# Patient Record
Sex: MALE | Race: WHITE | NOT HISPANIC OR LATINO | URBAN - METROPOLITAN AREA
[De-identification: names, ages, dates, MRNs, and addresses within clinical notes are randomized per-mention and may not be internally consistent; named-entity substitution may affect disease eponyms.]

---

## 2021-04-29 PROBLEM — Z00.00 ENCOUNTER FOR PREVENTIVE HEALTH EXAMINATION: Status: ACTIVE | Noted: 2021-04-29

## 2024-10-07 ENCOUNTER — INPATIENT (INPATIENT)
Facility: HOSPITAL | Age: 89
LOS: 2 days | Discharge: NURSING, REHAB OR LONG-TERM CARE | DRG: 379 | End: 2024-10-10
Attending: HOSPITALIST | Admitting: HOSPITALIST
Payer: MEDICARE

## 2024-10-07 VITALS
OXYGEN SATURATION: 98 % | DIASTOLIC BLOOD PRESSURE: 65 MMHG | TEMPERATURE: 97 F | HEART RATE: 82 BPM | RESPIRATION RATE: 19 BRPM | SYSTOLIC BLOOD PRESSURE: 106 MMHG

## 2024-10-07 DIAGNOSIS — K92.2 GASTROINTESTINAL HEMORRHAGE, UNSPECIFIED: ICD-10-CM

## 2024-10-07 PROCEDURE — 99222 1ST HOSP IP/OBS MODERATE 55: CPT | Mod: GC

## 2024-10-07 RX ORDER — PANTOPRAZOLE SODIUM 40 MG/1
80 TABLET, DELAYED RELEASE ORAL ONCE
Refills: 0 | Status: COMPLETED | OUTPATIENT
Start: 2024-10-07 | End: 2024-10-07

## 2024-10-07 RX ORDER — PANTOPRAZOLE SODIUM 40 MG/1
40 TABLET, DELAYED RELEASE ORAL EVERY 12 HOURS
Refills: 0 | Status: DISCONTINUED | OUTPATIENT
Start: 2024-10-08 | End: 2024-10-10

## 2024-10-07 RX ADMIN — PANTOPRAZOLE SODIUM 80 MILLIGRAM(S): 40 TABLET, DELAYED RELEASE ORAL at 23:52

## 2024-10-07 RX ADMIN — Medication 0.25 MILLIGRAM(S): at 23:45

## 2024-10-07 NOTE — H&P ADULT - NSHPPHYSICALEXAM_GEN_ALL_CORE
VITALS:   Vital Signs Last 24 Hrs  T(C): 36.3 (07 Oct 2024 22:38), Max: 36.3 (07 Oct 2024 22:38)  T(F): 97.3 (07 Oct 2024 22:38), Max: 97.3 (07 Oct 2024 22:38)  HR: 82 (07 Oct 2024 22:38) (82 - 82)  BP: 106/65 (07 Oct 2024 22:38) (106/65 - 106/65)  BP(mean): --  RR: 19 (07 Oct 2024 22:38) (19 - 19)  SpO2: 98% (07 Oct 2024 22:38) (98% - 98%)    Parameters below as of 07 Oct 2024 22:38  Patient On (Oxygen Delivery Method): room air      Pt refused examination  General examination: Pt NAD, lying in bed comfortably, Large ecchymosis of the Rt hand and forearm noted

## 2024-10-07 NOTE — H&P ADULT - PROBLEM SELECTOR PLAN 3
p/w Na 152  likely in the setting of dehydration and poor oral intake  currently NPO for tentative scope in AM  c/w D5LR  f/u serum and urine osm and urine Na

## 2024-10-07 NOTE — H&P ADULT - PROBLEM SELECTOR PLAN 2
hx of obstructive uropathy s/p suprapubic cath   p/w BUN and SCr of 44 and 1.37  FARHAN in the setting of poor oral intake and dehydration vs CKD   monitor BMP   c/w D5LR for hypernatremia and being NPO

## 2024-10-07 NOTE — H&P ADULT - ASSESSMENT
89 yrs old M, from home, wheelchair bound, pmhx of dementia, GERD, remote GIB, TBI, obstructive uropathy s/p suprapubic catheter, pshx benign thyroid mass resection, [facility] of upper GI bleed. Pt is admitted for GIB mnx requiring endoscopy.

## 2024-10-07 NOTE — H&P ADULT - PROBLEM SELECTOR PLAN 4
hx of dementia, AAOx1 at baseline, not on any medication at home  s/p Ativan 0.25 mg IV push x2 given agitation and combative

## 2024-10-07 NOTE — H&P ADULT - HISTORY OF PRESENT ILLNESS
89 yrs old M, from home, wheelchair bound, pmhx of dementia, GERD, remote GIB, TBI, obstructive uropathy s/p suprapubic catheter, pshx benign thyroid mass resection, transferred to [facility] for further management of upper GI bleed. Pt AAOx1 at baseline, [place]ese speaking, limited english speaking, refused to provide history and examination. Collateral information obtained from the patient's son, who stated that patient had a remote hx of DVT, concern for PE in the past however never on AC for it, reported episode of coffee ground emesis, melena for which he visited [facility], one unit pRBC was provided however extravasated and infiltrated the Rt arm, Hb on 10/6 was 8, INR WNL, BUN 86. SCr 1.5.   Of note, GOC discussed with the family, they would like to pursue CPR if and when required, refused intubation however would be accepting other forms  of non-invasive ventilation such as BIPAP should the need arise, no dialysis and no feeding tube.

## 2024-10-07 NOTE — H&P ADULT - PROBLEM SELECTOR PLAN 1
p/w coffee ground emesis, melena   Hb on admission 6.8, confirmation CBC showed Hb 7.2  Temp 97.3, HR 82, /65, RR 19 , Sat 98% on RA   no active bleeding since admission   will give 2U pRBC   s/p Protonix 80 mg IV  c/w Protonix 40 mg IV BID for now  NPO for tentative endoscopy in AM  GI consult in AM

## 2024-10-07 NOTE — H&P ADULT - ATTENDING COMMENTS
HPI  89 year old male Cape Verdean speaking patient with pmhx DVT many years ago and small PE many years ago (not on AC), Dementia (A&Ox1 at baseline), wheelchair-bound, remote history of Gi bleed, TBI, obstructive uropathy s/p suprapubic catheter, hx of benign thyroid mass resection who was transferred to [facility] for further management of coffee ground emesis and melena.  Patient is A&Ox1 and poor historian, mostly upset that he needs to get further blood work. Patient had coffee ground emesis and melena so he went to [facility] where he was found to have a Hgb in the 7's and transfused 2 units pRBCs with follow up CBC showing a Hgb of 7.3. BUN of 67 with an elevated BUN:Cr ratio of 48. Iron studies done showed iron of 43, TIBC 231, iron saturation 18.6%, Ferritin 52. Coags were WNL. GI evaluated patient but decided not to scope due to advanced age.  Called patient's son Silas (Director of Surgery/PA) (phone number [phone]). Patient has never had a colonoscopy or endoscopy. He takes no medications at home. No AC and no aspirin. He does not have history of CAD, COPD, HTN, HLD, or DM. Patient's son wants the patient to be CPR/DNI. Explained that in the hospital CPR is typically followed by intubation. Son understands but still wants his father to be CPR/DNI (BiPAP is okay)/No dialysis/No feeding tube and in case CPR happens, son wants patient to just have supportive care without intubation. Son gives verbal consent over the phone for pRBC transfusion as needed.     Physical Exam  General: Awake, Alert, Oriented x1, Upset due to getting blood draws  Cardiac: RRR  Pulmonary: CTA b/l  Abdominal: Soft, ND, NT  Extremities: No edema b/l, bruises on arms especially right arm present on arrival (due to blood draws and IV lines at prior hospital)    A/P  # Upper GI Bleed  > Hgb of 7.3 prior to transfer with BUN:Cr ratio of 48  > Iron 43, TIBC 231, Ferritin 52, Iron Sat 18.6%, Retic count 3.8 (Retic Index 0.98)  > s/p 2 units pRBC at [facility]  - Cardiac Telemetry Monitoring  - Check EKG  - Consult GI  - IV PPI BID  - SCDs  - NPO  - IV Fluid Hydration  - Serial CBC Q6H to monitor Hgb  - Transfuse for Hgb < 7    # Hypernatremia  > Serum sodium 152 on admission  - IV Fluid Hydration with D5-LR or D5-1/2 NS  - Monitor Serum Sodium  - Reduced serum sodium no more than 8-10 mEq/L/hr    # Hx of DVT/PE many years ago, Dementia (A&Ox1 at baseline), wheelchair-bound, GI Bleed, TBI, obstructive uropathy s/p suprapubic catheter, hx of benign thyroid mass resection  - Patient does not take any medications at home    # DVT PPx  - SCDs    # FEN  - NPO  - Monitor and replete electrolytes as needed  - IV Fluid Hydration  - Aspiration Precautions  - Fall Precautions    CPR/DNI (supportive care instead of intubation)    Patient case and management was discussed with ER Attending  I did examine all labs (including CBC, Reticulocyte Percentage, CMP, Mg, Phos, Troponin, pro-BNP), imaging, prior notes

## 2024-10-07 NOTE — H&P ADULT - CONVERSATION DETAILS
CHELITA discussed with pt's son. He expressed his wishes for CPR, refused intubation however would be accepting other forms  of non-invasive ventilation such as BIPAP should the need arise, no dialysis and no feeding tube.     Writer explained to the patient's son that per the protocol for CPR, intubation will be required and is part of the protocol. Further GOC to be done by Attending physician.

## 2024-10-08 ENCOUNTER — TRANSCRIPTION ENCOUNTER (OUTPATIENT)
Age: 89
End: 2024-10-08

## 2024-10-08 DIAGNOSIS — Z29.9 ENCOUNTER FOR PROPHYLACTIC MEASURES, UNSPECIFIED: ICD-10-CM

## 2024-10-08 DIAGNOSIS — N17.9 ACUTE KIDNEY FAILURE, UNSPECIFIED: ICD-10-CM

## 2024-10-08 DIAGNOSIS — K92.2 GASTROINTESTINAL HEMORRHAGE, UNSPECIFIED: ICD-10-CM

## 2024-10-08 DIAGNOSIS — E87.0 HYPEROSMOLALITY AND HYPERNATREMIA: ICD-10-CM

## 2024-10-08 LAB
ABO RH CONFIRMATION: SIGNIFICANT CHANGE UP
ALBUMIN SERPL ELPH-MCNC: 2.5 G/DL — LOW (ref 3.5–5)
ALP SERPL-CCNC: 55 U/L — SIGNIFICANT CHANGE UP (ref 40–120)
ALT FLD-CCNC: 11 U/L DA — SIGNIFICANT CHANGE UP (ref 10–60)
ANION GAP SERPL CALC-SCNC: 6 MMOL/L — SIGNIFICANT CHANGE UP (ref 5–17)
ANION GAP SERPL CALC-SCNC: 6 MMOL/L — SIGNIFICANT CHANGE UP (ref 5–17)
ANISOCYTOSIS BLD QL: SLIGHT — SIGNIFICANT CHANGE UP
APTT BLD: 31.4 SEC — SIGNIFICANT CHANGE UP (ref 24.5–35.6)
AST SERPL-CCNC: 11 U/L — SIGNIFICANT CHANGE UP (ref 10–40)
BASOPHILS # BLD AUTO: 0.07 K/UL — SIGNIFICANT CHANGE UP (ref 0–0.2)
BASOPHILS NFR BLD AUTO: 0.9 % — SIGNIFICANT CHANGE UP (ref 0–2)
BILIRUB SERPL-MCNC: 0.4 MG/DL — SIGNIFICANT CHANGE UP (ref 0.2–1.2)
BLD GP AB SCN SERPL QL: SIGNIFICANT CHANGE UP
BUN SERPL-MCNC: 39 MG/DL — HIGH (ref 7–18)
BUN SERPL-MCNC: 44 MG/DL — HIGH (ref 7–18)
CALCIUM SERPL-MCNC: 8.5 MG/DL — SIGNIFICANT CHANGE UP (ref 8.4–10.5)
CALCIUM SERPL-MCNC: 8.5 MG/DL — SIGNIFICANT CHANGE UP (ref 8.4–10.5)
CHLORIDE SERPL-SCNC: 120 MMOL/L — HIGH (ref 96–108)
CHLORIDE SERPL-SCNC: 121 MMOL/L — HIGH (ref 96–108)
CO2 SERPL-SCNC: 25 MMOL/L — SIGNIFICANT CHANGE UP (ref 22–31)
CO2 SERPL-SCNC: 25 MMOL/L — SIGNIFICANT CHANGE UP (ref 22–31)
CREAT SERPL-MCNC: 1.37 MG/DL — HIGH (ref 0.5–1.3)
CREAT SERPL-MCNC: 1.43 MG/DL — HIGH (ref 0.5–1.3)
EGFR: 46 ML/MIN/1.73M2 — LOW
EGFR: 49 ML/MIN/1.73M2 — LOW
EOSINOPHIL # BLD AUTO: 0.1 K/UL — SIGNIFICANT CHANGE UP (ref 0–0.5)
EOSINOPHIL NFR BLD AUTO: 1.3 % — SIGNIFICANT CHANGE UP (ref 0–6)
GLUCOSE BLDC GLUCOMTR-MCNC: 123 MG/DL — HIGH (ref 70–99)
GLUCOSE BLDC GLUCOMTR-MCNC: 131 MG/DL — HIGH (ref 70–99)
GLUCOSE BLDC GLUCOMTR-MCNC: 134 MG/DL — HIGH (ref 70–99)
GLUCOSE BLDC GLUCOMTR-MCNC: 181 MG/DL — HIGH (ref 70–99)
GLUCOSE SERPL-MCNC: 117 MG/DL — HIGH (ref 70–99)
GLUCOSE SERPL-MCNC: 123 MG/DL — HIGH (ref 70–99)
HCT VFR BLD CALC: 21.6 % — LOW (ref 39–50)
HCT VFR BLD CALC: 22.9 % — LOW (ref 39–50)
HCT VFR BLD CALC: 25.4 % — LOW (ref 39–50)
HGB BLD-MCNC: 6.8 G/DL — CRITICAL LOW (ref 13–17)
HGB BLD-MCNC: 7.2 G/DL — LOW (ref 13–17)
HGB BLD-MCNC: 8.2 G/DL — LOW (ref 13–17)
HYPOCHROMIA BLD QL: SIGNIFICANT CHANGE UP
IMM GRANULOCYTES NFR BLD AUTO: 0.5 % — SIGNIFICANT CHANGE UP (ref 0–0.9)
INR BLD: 0.88 RATIO — SIGNIFICANT CHANGE UP (ref 0.85–1.16)
LYMPHOCYTES # BLD AUTO: 1.87 K/UL — SIGNIFICANT CHANGE UP (ref 1–3.3)
LYMPHOCYTES # BLD AUTO: 24.3 % — SIGNIFICANT CHANGE UP (ref 13–44)
MAGNESIUM SERPL-MCNC: 2 MG/DL — SIGNIFICANT CHANGE UP (ref 1.6–2.6)
MANUAL SMEAR VERIFICATION: SIGNIFICANT CHANGE UP
MCHC RBC-ENTMCNC: 28.6 PG — SIGNIFICANT CHANGE UP (ref 27–34)
MCHC RBC-ENTMCNC: 29.3 PG — SIGNIFICANT CHANGE UP (ref 27–34)
MCHC RBC-ENTMCNC: 29.9 PG — SIGNIFICANT CHANGE UP (ref 27–34)
MCHC RBC-ENTMCNC: 31.4 GM/DL — LOW (ref 32–36)
MCHC RBC-ENTMCNC: 31.5 GM/DL — LOW (ref 32–36)
MCHC RBC-ENTMCNC: 32.3 GM/DL — SIGNIFICANT CHANGE UP (ref 32–36)
MCV RBC AUTO: 90.8 FL — SIGNIFICANT CHANGE UP (ref 80–100)
MCV RBC AUTO: 92.7 FL — SIGNIFICANT CHANGE UP (ref 80–100)
MCV RBC AUTO: 93.1 FL — SIGNIFICANT CHANGE UP (ref 80–100)
MICROCYTES BLD QL: SLIGHT — SIGNIFICANT CHANGE UP
MONOCYTES # BLD AUTO: 0.79 K/UL — SIGNIFICANT CHANGE UP (ref 0–0.9)
MONOCYTES NFR BLD AUTO: 10.3 % — SIGNIFICANT CHANGE UP (ref 2–14)
NEUTROPHILS # BLD AUTO: 4.83 K/UL — SIGNIFICANT CHANGE UP (ref 1.8–7.4)
NEUTROPHILS NFR BLD AUTO: 62.7 % — SIGNIFICANT CHANGE UP (ref 43–77)
NRBC # BLD: 0 /100 WBCS — SIGNIFICANT CHANGE UP (ref 0–0)
NT-PROBNP SERPL-SCNC: 145 PG/ML — SIGNIFICANT CHANGE UP (ref 0–450)
OVALOCYTES BLD QL SMEAR: SLIGHT — SIGNIFICANT CHANGE UP
PHOSPHATE SERPL-MCNC: 2.5 MG/DL — SIGNIFICANT CHANGE UP (ref 2.5–4.5)
PLAT MORPH BLD: NORMAL — SIGNIFICANT CHANGE UP
PLATELET # BLD AUTO: 204 K/UL — SIGNIFICANT CHANGE UP (ref 150–400)
PLATELET # BLD AUTO: 205 K/UL — SIGNIFICANT CHANGE UP (ref 150–400)
PLATELET # BLD AUTO: 208 K/UL — SIGNIFICANT CHANGE UP (ref 150–400)
PLATELET COUNT - ESTIMATE: NORMAL — SIGNIFICANT CHANGE UP
POIKILOCYTOSIS BLD QL AUTO: SLIGHT — SIGNIFICANT CHANGE UP
POTASSIUM SERPL-MCNC: 3.9 MMOL/L — SIGNIFICANT CHANGE UP (ref 3.5–5.3)
POTASSIUM SERPL-MCNC: 4 MMOL/L — SIGNIFICANT CHANGE UP (ref 3.5–5.3)
POTASSIUM SERPL-SCNC: 3.9 MMOL/L — SIGNIFICANT CHANGE UP (ref 3.5–5.3)
POTASSIUM SERPL-SCNC: 4 MMOL/L — SIGNIFICANT CHANGE UP (ref 3.5–5.3)
PROT SERPL-MCNC: 5.8 G/DL — LOW (ref 6–8.3)
PROTHROM AB SERPL-ACNC: 10.2 SEC — SIGNIFICANT CHANGE UP (ref 9.9–13.4)
RBC # BLD: 2.38 M/UL — LOW (ref 4.2–5.8)
RBC # BLD: 2.38 M/UL — LOW (ref 4.2–5.8)
RBC # BLD: 2.46 M/UL — LOW (ref 4.2–5.8)
RBC # BLD: 2.74 M/UL — LOW (ref 4.2–5.8)
RBC # FLD: 15 % — HIGH (ref 10.3–14.5)
RBC # FLD: 15.4 % — HIGH (ref 10.3–14.5)
RBC # FLD: 15.6 % — HIGH (ref 10.3–14.5)
RBC BLD AUTO: ABNORMAL
RETICS #: 87 K/UL — SIGNIFICANT CHANGE UP (ref 25–125)
RETICS/RBC NFR: 3.8 % — HIGH (ref 0.5–2.5)
SCHISTOCYTES BLD QL AUTO: SLIGHT — SIGNIFICANT CHANGE UP
SODIUM SERPL-SCNC: 151 MMOL/L — HIGH (ref 135–145)
SODIUM SERPL-SCNC: 152 MMOL/L — HIGH (ref 135–145)
SODIUM UR-SCNC: 114 MMOL/L — SIGNIFICANT CHANGE UP
STOMATOCYTES BLD QL SMEAR: SLIGHT — SIGNIFICANT CHANGE UP
TROPONIN I, HIGH SENSITIVITY RESULT: 21.4 NG/L — SIGNIFICANT CHANGE UP
WBC # BLD: 7.43 K/UL — SIGNIFICANT CHANGE UP (ref 3.8–10.5)
WBC # BLD: 7.7 K/UL — SIGNIFICANT CHANGE UP (ref 3.8–10.5)
WBC # BLD: 8.08 K/UL — SIGNIFICANT CHANGE UP (ref 3.8–10.5)
WBC # FLD AUTO: 7.43 K/UL — SIGNIFICANT CHANGE UP (ref 3.8–10.5)
WBC # FLD AUTO: 7.7 K/UL — SIGNIFICANT CHANGE UP (ref 3.8–10.5)
WBC # FLD AUTO: 8.08 K/UL — SIGNIFICANT CHANGE UP (ref 3.8–10.5)

## 2024-10-08 PROCEDURE — 99233 SBSQ HOSP IP/OBS HIGH 50: CPT

## 2024-10-08 PROCEDURE — 43255 EGD CONTROL BLEEDING ANY: CPT

## 2024-10-08 DEVICE — SET OTSC SYSTEM 11/6A: Type: IMPLANTABLE DEVICE | Status: FUNCTIONAL

## 2024-10-08 RX ORDER — SODIUM CHLORIDE IRRIG SOLUTION 0.9 %
1000 SOLUTION, IRRIGATION IRRIGATION
Refills: 0 | Status: DISCONTINUED | OUTPATIENT
Start: 2024-10-08 | End: 2024-10-09

## 2024-10-08 RX ORDER — SODIUM CHLORIDE IRRIG SOLUTION 0.9 %
1000 SOLUTION, IRRIGATION IRRIGATION
Refills: 0 | Status: DISCONTINUED | OUTPATIENT
Start: 2024-10-09 | End: 2024-10-09

## 2024-10-08 RX ORDER — SODIUM CHLORIDE IRRIG SOLUTION 0.9 %
1000 SOLUTION, IRRIGATION IRRIGATION
Refills: 0 | Status: DISCONTINUED | OUTPATIENT
Start: 2024-10-08 | End: 2024-10-08

## 2024-10-08 RX ADMIN — Medication 75 MILLILITER(S): at 12:35

## 2024-10-08 RX ADMIN — Medication 100 MILLILITER(S): at 17:31

## 2024-10-08 RX ADMIN — PANTOPRAZOLE SODIUM 40 MILLIGRAM(S): 40 TABLET, DELAYED RELEASE ORAL at 17:34

## 2024-10-08 RX ADMIN — Medication 1 GRAM(S): at 17:40

## 2024-10-08 RX ADMIN — PANTOPRAZOLE SODIUM 40 MILLIGRAM(S): 40 TABLET, DELAYED RELEASE ORAL at 05:41

## 2024-10-08 RX ADMIN — Medication 0.25 MILLIGRAM(S): at 02:09

## 2024-10-08 RX ADMIN — Medication 70 MILLILITER(S): at 02:09

## 2024-10-08 NOTE — PROGRESS NOTE ADULT - PROBLEM SELECTOR PLAN 1
p/w coffee ground emesis, melena   Hb on admission 6.8, confirmation CBC showed Hb 7.2  Temp 97.3, HR 82, /65, RR 19 , Sat 98% on RA   no active bleeding since admission   s/p 2 units PRBC at [facility]  hgb now 6.8-7.2  s/p 1 unit PRBC on 10/5   f/u repeat Hgb  c/w Protonix 40 mg IV BID for now  NPO for tentative endoscopy   GI consulted

## 2024-10-08 NOTE — CONSULT NOTE ADULT - ASSESSMENT
1. Coffee ground emesis in this patient is most likely due to:     - Erosive esophagitis     - Esophageal ulcer     - Peptic ulcer disease  2. Sever anemia (etiology multifactorial)  3. No evidence of acute GI bleeding at present time    Suggestions:    1. Monitor H/H  2. Transfuse PRBC as needed  3. Protonix 40mg BID  4. Clear liquid diet  5. Avoid NSAID  6. High risk for endoscopy  7. DVT prophylaxis

## 2024-10-08 NOTE — CONSULT NOTE ADULT - SUBJECTIVE AND OBJECTIVE BOX
[  ] STAT REQUEST              [ X ] ROUTINE REQUEST    Patient is a 89 year old male with GI bleeding. GI consulted to evaluate.        HPI:  89 year old male from home, wheelchair bound, with past medical history significant for Dementia, GERD, DVT, remote GIB, TBI, obstructive uropathy s/p suprapubic catheter, pshx benign thyroid mass resection, presented to the [facility] with coffee ground emesis. Patient now transferred to [facility] for further care. Patient AAOx1 at baseline, Maldivian speaking and can not provide history. Information obtained from the patient's son, who stated that patient had a remote hx of DVT, concern for PE in the past however never on AC for it, reported episode of coffee ground emesis, melena for which he visited [facility], one unit pRBC was provided however extravasated and patient's Hgb on 10/6 was 8, INR WNL, BUN 86. SCr 1.5. No abdominal pain, nausea, vomiting, hematochezia, melena, fever, chills, chest pain, SOB, cough, hemoptysis. epistaxis, hematuria or diarrhea reported.         PAIN MANAGEMENT:  Pain Scale:                0 /10  Pain Location:           PAST MEDICAL HISTORY    Dementia    GERD    GI bleeding    DVT    Obstructive uropathy        PAST SURGICAL HISTORY    Suprapubic catheter placement    Thyroid mass resection         Allergies    No Known Allergies    Intolerances  None         MEDICATIONS  (STANDING):  dextrose 5% + lactated ringers. 1000 milliLiter(s) (70 mL/Hr) IV Continuous <Continuous>  pantoprazole  Injectable 40 milliGRAM(s) IV Push every 12 hours         SOCIAL HISTORY  Advanced Directives:       [ X ] Full Code       [  ] DNR  Marital Status:         [  ] M      [ X ] S      [  ] D       [  ] W  Children:       [ X ] Yes      [  ] No  Occupation:        [  ] Employed       [X  ] Unemployed       [  ] Retired  Diet:       [ X ] Regular       [  ] PEG feeding          [  ] NG tube feeding  Drug Use:           [ X ] No               [  ] Yes  Alcohol:           [ X ] No             [  ] Yes (socially)         [  ] Yes (chronic)  Tobacco:           [  ] Yes           [ X ] No      FAMILY HISTORY  [ X ] Heart Disease            [ X ] Diabetes             [ X ] HTN             [  ] Colon Cancer             [  ] Stomach Cancer              [  ] Pancreatic Cancer      VITAL SIGNS   Vital Signs Last 24 Hrs  T(C): 36.3 (10-08-24 @ 10:36), Max: 36.4 (10-08-24 @ 00:21)  T(F): 97.3 (10-08-24 @ 10:36), Max: 97.5 (10-08-24 @ 00:21)  HR: 85 (10-08-24 @ 10:36) (76 - 98)  BP: 113/71 (10-08-24 @ 10:36) (100/60 - 121/102)   RR: 18 (10-08-24 @ 10:36) (18 - 20)  SpO2: 100% (10-08-24 @ 10:36) (97% - 100%)  Daily Weight in k.7 (08 Oct 2024 04:28)           CBC Full  -  ( 08 Oct 2024 01:35 )  WBC Count : 7.43 K/uL  RBC Count : 2.46 M/uL  Hemoglobin : 7.2 g/dL  Hematocrit : 22.9 %  Platelet Count - Automated : 205 K/uL  Mean Cell Volume : 93.1 fl  Mean Cell Hemoglobin : 29.3 pg  Mean Cell Hemoglobin Concentration : 31.4 gm/dL  Auto Neutrophil # : x  Auto Lymphocyte # : x  Auto Monocyte # : x  Auto Eosinophil # : x  Auto Basophil # : x  Auto Neutrophil % : x  Auto Lymphocyte % : x  Auto Monocyte % : x  Auto Eosinophil % : x  Auto Basophil % : x      10-08    152[H]  |  121[H]  |  44[H]  ----------------------------<  123[H]  4.0   |  25  |  1.37[H]    Ca    8.5      08 Oct 2024 00:33  Phos  2.5     10-08  Mg     2.0     10-08    TPro  5.8[L]  /  Alb  2.5[L]  /  TBili  0.4  /  DBili  x   /  AST  11  /  ALT  11  /  AlkPhos  55  10-     PT/INR - ( 08 Oct 2024 01:35 )   PT: 10.2 sec;   INR: 0.88 ratio       PTT - ( 08 Oct 2024 01:35 )  PTT:31.4 sec                                 
PATIENT SEEN AND EXAMINED BY BRENDEN CORTEZ M.D. ON :- 10/8/24  DATE OF SERVICE:   10/8/24          Interim events noted,Labs ,Radiological studies and Cardiology tests reviewed .    Patient is a 89y old  Male who presents with a chief complaint of acute anemia 2/2 possible Upper GI bleed (08 Oct 2024 12:38)      HPI:  89 yrs old M, from home, wheelchair bound, pmhx of dementia, GERD, remote GIB, TBI, obstructive uropathy s/p suprapubic catheter, pshx benign thyroid mass resection, transferred to [facility] for further management of upper GI bleed. Pt AAOx1 at baseline, Kazakh speaking, limited english speaking, refused to provide history and examination. Collateral information obtained from the patient's son, who stated that patient had a remote hx of DVT, concern for PE in the past however never on AC for it, reported episode of coffee ground emesis, melena for which he visited [facility], one unit pRBC was provided however extravasated and infiltrated the Rt arm, Hb on 10/6 was 8, INR WNL, BUN 86. SCr 1.5.   Of note, GOC discussed with the family, they would like to pursue CPR if and when required, refused intubation however would be accepting other forms  of non-invasive ventilation such as BIPAP should the need arise, no dialysis and no feeding tube.  (07 Oct 2024 22:50)      PAST MEDICAL & SURGICAL HISTORY:      PREVIOUS DIAGNOSTIC TESTING:      ECHO  FINDINGS:    STRESS  FINDINGS:    CATHETERIZATION  FINDINGS:    MEDICATIONS  (STANDING):  dextrose 5% + lactated ringers. 1000 milliLiter(s) (100 mL/Hr) IV Continuous <Continuous>  pantoprazole  Injectable 40 milliGRAM(s) IV Push every 12 hours  sucralfate 1 Gram(s) Oral every 6 hours    MEDICATIONS  (PRN):      FAMILY HISTORY:      SOCIAL HISTORY:    CIGARETTES:    ALCOHOL:    REVIEW OF SYSTEMS:  CONSTITUTIONAL: No fever, weight loss, or fatigue  EYES: No eye pain, visual disturbances, or discharge  ENMT:  No difficulty hearing, tinnitus, vertigo; No sinus or throat pain  NECK: No pain or stiffness  RESPIRATORY: No cough, wheezing, chills or hemoptysis; No shortness of breath  CARDIOVASCULAR: No chest pain, palpitations, dizziness, or leg swelling  GASTROINTESTINAL: No abdominal or epigastric pain. No nausea, vomiting, or hematemesis; No diarrhea or constipation. No melena or hematochezia.  GENITOURINARY: No dysuria, frequency, hematuria, or incontinence  NEUROLOGICAL: No headaches, memory loss, loss of strength, numbness, or tremors  SKIN: No itching, burning, rashes, or lesions   LYMPH NODES: No enlarged glands  ENDOCRINE: No heat or cold intolerance; No hair loss  MUSCULOSKELETAL: No joint pain or swelling; No muscle, back, or extremity pain  PSYCHIATRIC: No depression, anxiety, mood swings, or difficulty sleeping  HEME/LYMPH: No easy bruising, or bleeding gums  ALLERY AND IMMUNOLOGIC: No hives or eczema    Vital Signs Last 24 Hrs  T(C): 36.4 (08 Oct 2024 19:35), Max: 36.5 (08 Oct 2024 15:28)  T(F): 97.5 (08 Oct 2024 19:35), Max: 97.7 (08 Oct 2024 15:28)  HR: 75 (08 Oct 2024 19:35) (75 - 98)  BP: 90/54 (08 Oct 2024 19:35) (89/51 - 121/102)  BP(mean): --  RR: 18 (08 Oct 2024 19:35) (16 - 20)  SpO2: 95% (08 Oct 2024 19:35) (94% - 100%)    Parameters below as of 08 Oct 2024 19:35  Patient On (Oxygen Delivery Method): room air          PHYSICAL EXAM:  GENERAL: NAD, well-groomed, well-developed  HEAD:  Atraumatic, Normocephalic  EYES: EOMI, PERRLA, conjunctiva and sclera clear  ENMT: No tonsillar erythema, exudates, or enlargement; Moist mucous membranes, Good dentition, No lesions  NECK: Supple, No JVD, Normal thyroid  NERVOUS SYSTEM:  Alert & Oriented X3, Good concentration; Motor Strength 5/5 B/L upper and lower extremities; DTRs 2+ intact and symmetric  CHEST/LUNG: Clear to percussion bilaterally; No rales, rhonchi, wheezing, or rubs  HEART: Regular rate and rhythm; No murmurs, rubs, or gallops  ABDOMEN: Soft, Nontender, Nondistended; Bowel sounds present  EXTREMITIES:  2+ Peripheral Pulses, No clubbing, cyanosis, or edema  LYMPH: No lymphadenopathy noted  SKIN: No rashes or lesions      INTERPRETATION OF TELEMETRY:    ECG:    CHADSVASC:     LABS:                        8.2    8.08  )-----------( 204      ( 08 Oct 2024 12:31 )             25.4     10-08    151[H]  |  120[H]  |  39[H]  ----------------------------<  117[H]  3.9   |  25  |  1.43[H]    Ca    8.5      08 Oct 2024 12:31  Phos  2.5     10-08  Mg     2.0     10-08    TPro  5.8[L]  /  Alb  2.5[L]  /  TBili  0.4  /  DBili  x   /  AST  11  /  ALT  11  /  AlkPhos  55  10-08        PT/INR - ( 08 Oct 2024 01:35 )   PT: 10.2 sec;   INR: 0.88 ratio         PTT - ( 08 Oct 2024 01:35 )  PTT:31.4 sec  Urinalysis Basic - ( 08 Oct 2024 12:31 )    Color: x / Appearance: x / SG: x / pH: x  Gluc: 117 mg/dL / Ketone: x  / Bili: x / Urobili: x   Blood: x / Protein: x / Nitrite: x   Leuk Esterase: x / RBC: x / WBC x   Sq Epi: x / Non Sq Epi: x / Bacteria: x      Lipid Panel:   I&O's Summary    07 Oct 2024 07:01  -  08 Oct 2024 07:00  --------------------------------------------------------  IN: 420 mL / OUT: 550 mL / NET: -130 mL    08 Oct 2024 07:01  -  08 Oct 2024 22:03  --------------------------------------------------------  IN: 0 mL / OUT: 800 mL / NET: -800 mL        RADIOLOGY & ADDITIONAL STUDIES:

## 2024-10-08 NOTE — PATIENT PROFILE ADULT - NSPROGENPREVTRANSF_GEN_A_NUR
The patient has been re-examined and I agree with the above assessment or I updated with my findings. cognitively impaired unable to assess

## 2024-10-08 NOTE — PATIENT PROFILE ADULT - FALL HARM RISK - HARM RISK INTERVENTIONS
Assistance with ambulation/Assistance OOB with selected safe patient handling equipment/Communicate Risk of Fall with Harm to all staff/Monitor for mental status changes/Reinforce activity limits and safety measures with patient and family/Reorient to person, place and time as needed/Review medications for side effects contributing to fall risk/Tailored Fall Risk Interventions/Use of alarms - bed, chair and/or voice tab/Visual Cue: Yellow wristband and red socks/Bed in lowest position, wheels locked, appropriate side rails in place/Call bell, personal items and telephone in reach/Instruct patient to call for assistance before getting out of bed or chair/Non-slip footwear when patient is out of bed/[place] to call system/Physically safe environment - no spills, clutter or unnecessary equipment/Purposeful Proactive Rounding/Room/bathroom lighting operational, light cord in reach

## 2024-10-09 ENCOUNTER — TRANSCRIPTION ENCOUNTER (OUTPATIENT)
Age: 89
End: 2024-10-09

## 2024-10-09 DIAGNOSIS — D64.9 ANEMIA, UNSPECIFIED: ICD-10-CM

## 2024-10-09 DIAGNOSIS — Z98.890 OTHER SPECIFIED POSTPROCEDURAL STATES: ICD-10-CM

## 2024-10-09 DIAGNOSIS — K26.4 CHRONIC OR UNSPECIFIED DUODENAL ULCER WITH HEMORRHAGE: ICD-10-CM

## 2024-10-09 DIAGNOSIS — E44.0 MODERATE PROTEIN-CALORIE MALNUTRITION: ICD-10-CM

## 2024-10-09 DIAGNOSIS — D62 ACUTE POSTHEMORRHAGIC ANEMIA: ICD-10-CM

## 2024-10-09 LAB
ANION GAP SERPL CALC-SCNC: 5 MMOL/L — SIGNIFICANT CHANGE UP (ref 5–17)
BUN SERPL-MCNC: 27 MG/DL — HIGH (ref 7–18)
CALCIUM SERPL-MCNC: 8.5 MG/DL — SIGNIFICANT CHANGE UP (ref 8.4–10.5)
CHLORIDE SERPL-SCNC: 117 MMOL/L — HIGH (ref 96–108)
CO2 SERPL-SCNC: 26 MMOL/L — SIGNIFICANT CHANGE UP (ref 22–31)
CREAT SERPL-MCNC: 1.22 MG/DL — SIGNIFICANT CHANGE UP (ref 0.5–1.3)
EGFR: 56 ML/MIN/1.73M2 — LOW
GLUCOSE SERPL-MCNC: 155 MG/DL — HIGH (ref 70–99)
HCT VFR BLD CALC: 22.7 % — LOW (ref 39–50)
HGB BLD-MCNC: 7.1 G/DL — LOW (ref 13–17)
MAGNESIUM SERPL-MCNC: 1.8 MG/DL — SIGNIFICANT CHANGE UP (ref 1.6–2.6)
MCHC RBC-ENTMCNC: 29.3 PG — SIGNIFICANT CHANGE UP (ref 27–34)
MCHC RBC-ENTMCNC: 31.3 GM/DL — LOW (ref 32–36)
MCV RBC AUTO: 93.8 FL — SIGNIFICANT CHANGE UP (ref 80–100)
NRBC # BLD: 0 /100 WBCS — SIGNIFICANT CHANGE UP (ref 0–0)
OSMOLALITY UR: 564 MOSM/KG — SIGNIFICANT CHANGE UP (ref 50–1200)
PHOSPHATE SERPL-MCNC: 2.2 MG/DL — LOW (ref 2.5–4.5)
PLATELET # BLD AUTO: 201 K/UL — SIGNIFICANT CHANGE UP (ref 150–400)
POTASSIUM SERPL-MCNC: 3.6 MMOL/L — SIGNIFICANT CHANGE UP (ref 3.5–5.3)
POTASSIUM SERPL-SCNC: 3.6 MMOL/L — SIGNIFICANT CHANGE UP (ref 3.5–5.3)
RBC # BLD: 2.42 M/UL — LOW (ref 4.2–5.8)
RBC # FLD: 15.6 % — HIGH (ref 10.3–14.5)
SODIUM SERPL-SCNC: 148 MMOL/L — HIGH (ref 135–145)
WBC # BLD: 8.39 K/UL — SIGNIFICANT CHANGE UP (ref 3.8–10.5)
WBC # FLD AUTO: 8.39 K/UL — SIGNIFICANT CHANGE UP (ref 3.8–10.5)

## 2024-10-09 PROCEDURE — 99233 SBSQ HOSP IP/OBS HIGH 50: CPT

## 2024-10-09 RX ORDER — SODIUM CHLORIDE IRRIG SOLUTION 0.9 %
1000 SOLUTION, IRRIGATION IRRIGATION
Refills: 0 | Status: DISCONTINUED | OUTPATIENT
Start: 2024-10-09 | End: 2024-10-10

## 2024-10-09 RX ORDER — SODIUM CHLORIDE IRRIG SOLUTION 0.9 %
500 SOLUTION, IRRIGATION IRRIGATION ONCE
Refills: 0 | Status: COMPLETED | OUTPATIENT
Start: 2024-10-09 | End: 2024-10-09

## 2024-10-09 RX ORDER — SODIUM CHLORIDE IRRIG SOLUTION 0.9 %
1000 SOLUTION, IRRIGATION IRRIGATION
Refills: 0 | Status: DISCONTINUED | OUTPATIENT
Start: 2024-10-09 | End: 2024-10-09

## 2024-10-09 RX ADMIN — Medication 100 MILLILITER(S): at 04:44

## 2024-10-09 RX ADMIN — PANTOPRAZOLE SODIUM 40 MILLIGRAM(S): 40 TABLET, DELAYED RELEASE ORAL at 05:04

## 2024-10-09 RX ADMIN — Medication 1 GRAM(S): at 05:04

## 2024-10-09 RX ADMIN — Medication 100 MILLILITER(S): at 00:52

## 2024-10-09 RX ADMIN — Medication 1 GRAM(S): at 00:54

## 2024-10-09 RX ADMIN — Medication 1000 MILLILITER(S): at 18:48

## 2024-10-09 RX ADMIN — PANTOPRAZOLE SODIUM 40 MILLIGRAM(S): 40 TABLET, DELAYED RELEASE ORAL at 17:58

## 2024-10-09 RX ADMIN — Medication 1 GRAM(S): at 17:58

## 2024-10-09 RX ADMIN — Medication 100 MILLILITER(S): at 18:24

## 2024-10-09 NOTE — PROGRESS NOTE ADULT - PROBLEM SELECTOR PLAN 1
p/w coffee ground emesis, melena   Hb on admission 6.8, confirmation CBC showed Hb 7.2  Temp 97.3, HR 82, /65, RR 19 , Sat 98% on RA   no active bleeding since admission   s/p 2 units PRBC at [facility]  s/p 1 unit PRBC on 10/5 > hgb 8.2  s/p endoscopy on 10/9 with clipping of duodenal ulcer   hgb today 7.1, transfuse 1 more unit prbc   c/w Protonix 40 mg IV BID for now  c/w sucralfate 1g qid x2 weeks   f/u h. pylori stool   GI following

## 2024-10-09 NOTE — PROGRESS NOTE ADULT - PROBLEM SELECTOR PLAN 6
hx of dementia, AAOx1 at baseline, not on any medication at home  s/p Ativan 0.25 mg IV push x2 given agitation and combative  hold ativan for now  c/w enhanced observation  fall risk precautions

## 2024-10-09 NOTE — PROGRESS NOTE ADULT - PROBLEM SELECTOR PLAN 5
DVT- SCD   GI - PPI IV
pt p/w suprapubic catheter prior to arrival from [facility]  catheter appears to be very old with blue/green colored biofilm inside catheter  consider urology eval for exchange

## 2024-10-09 NOTE — PROGRESS NOTE ADULT - PROBLEM SELECTOR PLAN 1
-Monitor H&H  -Transfuse as needed  -s/p EGD as noted above, Duodenal bulb ulcer with visible vessel. Hemostasis with over-the-scope clip.  -Clear liquid diet. Advance as tolerated  -Pantoprazole 40 mg IV q12h  -Use Carafate 1g orally four times daily for 2 weeks  -Check stool H. pylori Ag, treat if positive  -GI will continue to follow    This note and its recommendations herein are preliminary until such time as cosigned by an attending. -Monitor H&H  -Transfuse as needed  -s/p EGD as noted above, Duodenal bulb ulcer with visible vessel. Hemostasis with over-the-scope clip.  -Clear liquid diet.   -Pantoprazole 40 mg IV q12h  -Use Carafate 1g orally four times daily for 2 weeks  -Check stool H. pylori Ag, treat if positive  -GI will continue to follow    This note and its recommendations herein are preliminary until such time as cosigned by an attending.

## 2024-10-09 NOTE — DISCHARGE NOTE PROVIDER - NSDCMRMEDTOKEN_GEN_ALL_CORE_FT
Protonix 40 mg oral delayed release tablet: 1 tab(s) orally every 12 hours  sucralfate 1 g oral tablet: 1 tab(s) orally every 6 hours

## 2024-10-09 NOTE — DISCHARGE NOTE PROVIDER - CARE PROVIDER_API CALL
Inés Wilhelm  Gastroenterology  [address]  [place] [address]  Phone: [phone]  Fax: [phone]  Follow Up Time: 1 week

## 2024-10-09 NOTE — DISCHARGE NOTE PROVIDER - NSDCCPCAREPLAN_GEN_ALL_CORE_FT
PRINCIPAL DISCHARGE DIAGNOSIS  Diagnosis: Bleeding duodenal ulcer  Assessment and Plan of Treatment: you initially transferred from [facility]   you had a total of 4 units of blood so far  you had a endoscopy on 10/8 that showed duodenal ulcer and you had it clipped  you must continue taking protonix 40 mg twice a day  and continue sucralfate 1g four times a day for the next 2 weeks 10/8-10/20.   you should also follow up with your Primary Care Doctor  follow up with Gastroenterologist Dr. Wilhelm and make an appointment      SECONDARY DISCHARGE DIAGNOSES  Diagnosis: Anemia due to acute blood loss  Assessment and Plan of Treatment: you were recently found to have anemia due to bleeding from ulcer  you were given multiple units of blood  Eat healthy foods rich in iron and vitamin C. Nuts, meat, dark leafy green vegetables, and beans are high in iron and protein. Vitamin C helps your body absorb iron. Foods rich in vitamin C include oranges and other citrus fruits.   follow up with your Primary Care doctor to monitor your hemoglobin.    Diagnosis: Hypernatremia  Assessment and Plan of Treatment: you were found to have high sodium levels due to poor oral intake  you were given IV fluids and your sodium levels improved   try to drink 6-8 glasses of water a day if possible  stay hydrated    Diagnosis: FARHAN (acute kidney injury)  Assessment and Plan of Treatment: you were found to have mildly elevated creatinine due to recent blood loss and poor oral intake. this is now improved by discharge.    Diagnosis: History of suprapubic catheter  Assessment and Plan of Treatment: you have a history of suprapubic catheter,  it is functioning properly draining clear yellow urine  follow up with your outpatient urologist.   monitor for any abdominal pain, fever, or poor output from [place]    Diagnosis: Dementia  Assessment and Plan of Treatment: you have a history of dementia and traumatic brain injury  you are not on any medications  you are at risk of falls  sit up when your eat to preven aspiration    Diagnosis: Moderate protein-calorie malnutrition  Assessment and Plan of Treatment: your albumin level was 2.5   continue to eat as tolerated and use ensure shakes/pudding three times a day     PRINCIPAL DISCHARGE DIAGNOSIS  Diagnosis: Bleeding duodenal ulcer  Assessment and Plan of Treatment: you were admitted for gastrointestinal bleeding  you had a total of 2 units of blood so far  you had a endoscopy on 10/8 that showed duodenal ulcer and you had it clipped  you must continue taking protonix 40 mg twice a day  and continue sucralfate 1g four times a day for the next 2 weeks 10/8-10/20.   you should also follow up with your Primary Care Doctor  follow up with Gastroenterologist Dr. Wilhelm and make an appointment      SECONDARY DISCHARGE DIAGNOSES  Diagnosis: Anemia due to acute blood loss  Assessment and Plan of Treatment: you were recently found to have anemia due to bleeding from ulcer  you were given multiple units of blood  Eat healthy foods rich in iron and vitamin C. Nuts, meat, dark leafy green vegetables, and beans are high in iron and protein. Vitamin C helps your body absorb iron. Foods rich in vitamin C include oranges and other citrus fruits.   follow up with your Primary Care doctor to monitor your hemoglobin.    Diagnosis: FARHAN (acute kidney injury)  Assessment and Plan of Treatment: you were found to have mildly elevated creatinine due to recent blood loss and poor oral intake. follow up with primary care provider for surveillance  Manage FARHAN  Manage other health conditions such as diabetes, high blood pressure, or heart disease. These conditions increase your risk for acute kidney injury. Take your medicines for these conditions as directed. Also, monitor your blood sugar and blood pressure levels as directed.  NSAIDs, stomach medicine, or laxatives may harm your kidneys and increase your risk for acute kidney injury  Drink liquids to help your kidneys work better and decrease your risk for dehydration.      Diagnosis: Hypernatremia  Assessment and Plan of Treatment: you were found to have high sodium levels due to poor oral intake  you were given IV fluids and your sodium levels improved   try to drink 6-8 glasses of water a day if possible  stay hydrated    Diagnosis: History of suprapubic catheter  Assessment and Plan of Treatment: you have a history of suprapubic catheter. The catheter was changed today 10/10/2024  it is functioning properly draining clear yellow urine  follow up with your outpatient urologist.   monitor for any abdominal pain, fever, or poor output from [place]    Diagnosis: Dementia  Assessment and Plan of Treatment: you have a history of dementia and traumatic brain injury  you are not on any medications  you are at risk of falls  sit up when your eat to prevent aspiration    Diagnosis: Moderate protein-calorie malnutrition  Assessment and Plan of Treatment: your albumin level was 2.5   continue to eat as tolerated and use ensure shakes/pudding three times a day

## 2024-10-09 NOTE — DISCHARGE NOTE PROVIDER - DETAILS OF MALNUTRITION DIAGNOSIS/DIAGNOSES
This patient has been assessed with a concern for Malnutrition and was treated during this hospitalization for the following Nutrition diagnosis/diagnoses:     -  10/10/2024: Moderate protein-calorie malnutrition

## 2024-10-09 NOTE — PROGRESS NOTE ADULT - NS ATTEND AMEND GEN_ALL_CORE FT
Patient seen and examined in the morning. PRBC tfused today after admission, good hb response. GO consult appreciated- s/p EGD - duodenal ulcer s/p hemostasis. plan for CLD , PPI BID and carafate per GI recs. c/w ivf  check cbc , bmp mg phos in am  hold all blood thinners    a/p# Acute blood loss anemia 2/2 duodenal ucler # Advance dementia # FARHAN
a/p# Acute blood loss anemia 2/2 duodenal ucler # Advance dementia # FARHAN # Suprapubic Cath # Functional Quadriplegia  - Unable to obtain ros 2/2 mentation. patient looks comfortable. hb trended down but no melena or hematemesis. will tfuse 1 U PRBC, check cbc tomorrow  am. keep clear liquid diet for now, d.w GI- Dr. Wilhelm  -suprapubic catheter exchange prior to discharge  -FARHAN and Hypernatremia improving  -check bmp mg phos in am  c/w d5   d/c telemetry  c/w rest of medical mx

## 2024-10-09 NOTE — PROGRESS NOTE ADULT - PROBLEM SELECTOR PLAN 3
p/w Na 152  likely in the setting of dehydration and poor oral intake  currently NPO for tentative scope   c/w D5LR
hx of obstructive uropathy s/p suprapubic cath   p/w BUN and SCr of 44 and 1.37  FARHAN in the setting of poor oral intake and dehydration vs CKD   creatinine now improving on IV fluids - SCr 1.22  monitor BMP

## 2024-10-09 NOTE — PROGRESS NOTE ADULT - PROBLEM SELECTOR PLAN 2
hx of obstructive uropathy s/p suprapubic cath   p/w BUN and SCr of 44 and 1.37  FARHAN in the setting of poor oral intake and dehydration vs CKD   monitor BMP   c/w D5LR for hypernatremia and being NPO
as above

## 2024-10-09 NOTE — DISCHARGE NOTE PROVIDER - HOSPITAL COURSE
89 yrs old M, from LTC facility, Prydein[place] speaking, wheelchair bound, pmhx of dementia, GERD, remote GIB, TBI, obstructive uropathy s/p suprapubic catheter, pshx benign thyroid mass resection, transferred from [facility] to [facility] for further management of upper GI bleed. Pt is admitted for GIB requiring endoscopy. Pt is now s/p 3 units PRBC total so far. GI following, s/p endoscopy on 10/9, found to have bleeding duodenal ulcer s/p clipping. Repeat hgb today now 7.1 with hypotension, now s/p additional 1 unit PRBC transfusion on 10/9. Pt is tolerating regular diet. Awaiting dc back to LTC.     incomplete 10/9/24 89 yrs old M, from LTC facility, [place] speaking, wheelchair bound, pmhx of dementia, GERD, remote GIB, TBI, obstructive uropathy s/p suprapubic catheter, pshx benign thyroid mass resection, transferred from [facility] to [facility] for further management of upper GI bleed. Pt is admitted for GIB requiring endoscopy. Pt is s/p 2 units PRBC. GI followed, endoscopy on 10/9, found to have bleeding duodenal ulcer s/p clipping. Repeat hgb today now 8.1. Pt is tolerating advanced diet.    GI recs: Pantoprazole 40 mg IV q12h. Use Carafate 1g orally four times daily for 2 weeks    Pt is medically optimized for discharge, discussed with the attending Dr Mchugh  This is just a brief hospital course, please refer to the progress notes for detailed information

## 2024-10-09 NOTE — PROGRESS NOTE ADULT - PROBLEM SELECTOR PLAN 4
hx of dementia, AAOx1 at baseline, not on any medication at home  s/p Ativan 0.25 mg IV push x2 given agitation and combative  hold ativan for now  c/w enhanced observation  fall risk precautions
p/w Na 152  likely in the setting of dehydration and poor oral intake  Na downtrending now 148   c/w D5LR @100cc/hr  goal Na decrease no more than 6-8 meq in 24 hours

## 2024-10-10 ENCOUNTER — TRANSCRIPTION ENCOUNTER (OUTPATIENT)
Age: 89
End: 2024-10-10

## 2024-10-10 VITALS
TEMPERATURE: 98 F | OXYGEN SATURATION: 95 % | RESPIRATION RATE: 18 BRPM | HEART RATE: 71 BPM | DIASTOLIC BLOOD PRESSURE: 70 MMHG | SYSTOLIC BLOOD PRESSURE: 130 MMHG

## 2024-10-10 LAB
ANION GAP SERPL CALC-SCNC: 5 MMOL/L — SIGNIFICANT CHANGE UP (ref 5–17)
BUN SERPL-MCNC: 26 MG/DL — HIGH (ref 7–18)
CALCIUM SERPL-MCNC: 8 MG/DL — LOW (ref 8.4–10.5)
CHLORIDE SERPL-SCNC: 114 MMOL/L — HIGH (ref 96–108)
CO2 SERPL-SCNC: 24 MMOL/L — SIGNIFICANT CHANGE UP (ref 22–31)
CREAT SERPL-MCNC: 1.44 MG/DL — HIGH (ref 0.5–1.3)
EGFR: 46 ML/MIN/1.73M2 — LOW
GLUCOSE SERPL-MCNC: 139 MG/DL — HIGH (ref 70–99)
HCT VFR BLD CALC: 25.1 % — LOW (ref 39–50)
HGB BLD-MCNC: 8.1 G/DL — LOW (ref 13–17)
MCHC RBC-ENTMCNC: 29.9 PG — SIGNIFICANT CHANGE UP (ref 27–34)
MCHC RBC-ENTMCNC: 32.3 GM/DL — SIGNIFICANT CHANGE UP (ref 32–36)
MCV RBC AUTO: 92.6 FL — SIGNIFICANT CHANGE UP (ref 80–100)
NRBC # BLD: 0 /100 WBCS — SIGNIFICANT CHANGE UP (ref 0–0)
PLATELET # BLD AUTO: 189 K/UL — SIGNIFICANT CHANGE UP (ref 150–400)
POTASSIUM SERPL-MCNC: 3.7 MMOL/L — SIGNIFICANT CHANGE UP (ref 3.5–5.3)
POTASSIUM SERPL-SCNC: 3.7 MMOL/L — SIGNIFICANT CHANGE UP (ref 3.5–5.3)
RBC # BLD: 2.71 M/UL — LOW (ref 4.2–5.8)
RBC # FLD: 16.5 % — HIGH (ref 10.3–14.5)
SODIUM SERPL-SCNC: 143 MMOL/L — SIGNIFICANT CHANGE UP (ref 135–145)
WBC # BLD: 8.52 K/UL — SIGNIFICANT CHANGE UP (ref 3.8–10.5)
WBC # FLD AUTO: 8.52 K/UL — SIGNIFICANT CHANGE UP (ref 3.8–10.5)

## 2024-10-10 PROCEDURE — 99239 HOSP IP/OBS DSCHRG MGMT >30: CPT

## 2024-10-10 RX ORDER — PANTOPRAZOLE SODIUM 40 MG/1
1 TABLET, DELAYED RELEASE ORAL
Qty: 60 | Refills: 0
Start: 2024-10-10 | End: 2024-11-08

## 2024-10-10 RX ADMIN — Medication 1 GRAM(S): at 01:03

## 2024-10-10 RX ADMIN — PANTOPRAZOLE SODIUM 40 MILLIGRAM(S): 40 TABLET, DELAYED RELEASE ORAL at 06:17

## 2024-10-10 RX ADMIN — Medication 1 GRAM(S): at 12:04

## 2024-10-10 RX ADMIN — Medication 1 GRAM(S): at 06:17

## 2024-10-10 NOTE — PROGRESS NOTE ADULT - SUBJECTIVE AND OBJECTIVE BOX
[   ] ICU                                          [   ] CCU                                      [ X  ] Medical Floor    Patient is a 89 year old male with GI bleeding. GI consulted to evaluate.       89 year old male from home, wheelchair bound, with past medical history significant for Dementia, GERD, DVT, remote GIB, TBI, obstructive uropathy s/p suprapubic catheter, pshx benign thyroid mass resection, presented to the [facility] with coffee ground emesis. Patient now transferred to [facility] for further care. Patient AAOx1 at baseline, Polish speaking and can not provide history. Information obtained from the patient's son, who stated that patient had a remote hx of DVT, concern for PE in the past however never on AC for it, reported episode of coffee ground emesis, melena for which he visited [facility], one unit pRBC was provided however extravasated and patient's Hgb on 10/6 was 8, INR WNL, BUN 86. SCr 1.5. No abdominal pain, nausea, vomiting, hematochezia, melena, fever, chills, chest pain, SOB, cough, hemoptysis, epistaxis, hematuria or diarrhea reported.    Patient appears comfortable today. No new complaints reported, No abdominal pain, N/V, hematemesis, hematochezia, melena, fever, chills, chest pain, SOB, cough or diarrhea reported.         PAIN MANAGEMENT:  Pain Scale:                0 /10  Pain Location:           PAST MEDICAL HISTORY    Dementia    GERD    GI bleeding    DVT    Obstructive uropathy        PAST SURGICAL HISTORY    Suprapubic catheter placement    Thyroid mass resection         Allergies    No Known Allergies    Intolerances  None          SOCIAL HISTORY  Advanced Directives:       [ X ] Full Code       [  ] DNR  Marital Status:         [  ] M      [ X ] S      [  ] D       [  ] W  Children:       [ X ] Yes      [  ] No  Occupation:        [  ] Employed       [X  ] Unemployed       [  ] Retired  Diet:       [ X ] Regular       [  ] PEG feeding          [  ] NG tube feeding  Drug Use:           [ X ] No               [  ] Yes  Alcohol:           [ X ] No             [  ] Yes (socially)         [  ] Yes (chronic)  Tobacco:           [  ] Yes           [ X ] No      FAMILY HISTORY  [ X ] Heart Disease            [ X ] Diabetes             [ X ] HTN             [  ] Colon Cancer             [  ] Stomach Cancer              [  ] Pancreatic Cancer        VITALS   Vital Signs Last 24 Hrs  T(C): 36.4 (10-09-24 @ 11:14), Max: 36.6 (10-08-24 @ 23:29)  T(F): 97.5 (10-09-24 @ 11:14), Max: 97.8 (10-08-24 @ 23:29)  HR: 74 (10-09-24 @ 11:14) (74 - 100)  BP: 85/48 (10-09-24 @ 11:14) (85/48 - 107/59)   RR: 18 (10-09-24 @ 11:14) (16 - 20)  SpO2: 100% (10-09-24 @ 11:14) (94% - 100%)      MEDICATIONS  (STANDING):  dextrose 5% + lactated ringers. 1000 milliLiter(s) (100 mL/Hr) IV Continuous <Continuous>  dextrose 5% + lactated ringers. 1000 milliLiter(s) (100 mL/Hr) IV Continuous <Continuous>  pantoprazole  Injectable 40 milliGRAM(s) IV Push every 12 hours  sucralfate 1 Gram(s) Oral every 6 hours                               7.1    8.39  )-----------( 201      ( 09 Oct 2024 08:14 )             22.7       10-09    148[H]  |  117[H]  |  27[H]  ----------------------------<  155[H]  3.6   |  26  |  1.22    Ca    8.5      09 Oct 2024 08:14  Phos  2.2     10-09  Mg     1.8     10-09    TPro  5.8[L]  /  Alb  2.5[L]  /  TBili  0.4  /  DBili  x   /  AST  11  /  ALT  11  /  AlkPhos  55  10-08      PT/INR - ( 08 Oct 2024 01:35 )   PT: 10.2 sec;   INR: 0.88 ratio         PTT - ( 08 Oct 2024 01:35 )  PTT:31.4 sec
Patient is a 89y old  Male who presents with a chief complaint of GIB (08 Oct 2024 11:19)    OVERNIGHT EVENTS: Pt was given ativan for delirium due to dementia at 2am.     Pt is confused, comfortable appearing, currently NPO for possible endoscopy, remains in bed due to weakness. Currently on enhanced observation for safety.   Remains on Telemetry - showing Sinus Rhythm 80s bpm.     REVIEW OF SYSTEMS: British  used   unable to assess due to dementia     T(C): 36.3 (10-08-24 @ 10:36), Max: 36.4 (10-08-24 @ 00:21)  HR: 85 (10-08-24 @ 10:36) (76 - 98)  BP: 113/71 (10-08-24 @ 10:36) (100/60 - 121/102)  RR: 18 (10-08-24 @ 10:36) (18 - 20)  SpO2: 100% (10-08-24 @ 10:36) (97% - 100%)  Wt(kg): --Vital Signs Last 24 Hrs  T(C): 36.3 (08 Oct 2024 10:36), Max: 36.4 (08 Oct 2024 00:21)  T(F): 97.3 (08 Oct 2024 10:36), Max: 97.5 (08 Oct 2024 00:21)  HR: 85 (08 Oct 2024 10:36) (76 - 98)  BP: 113/71 (08 Oct 2024 10:36) (100/60 - 121/102)  BP(mean): --  RR: 18 (08 Oct 2024 10:36) (18 - 20)  SpO2: 100% (08 Oct 2024 10:36) (97% - 100%)    Parameters below as of 08 Oct 2024 10:36  Patient On (Oxygen Delivery Method): room air        MEDICATIONS  (STANDING):  dextrose 5% + lactated ringers. 1000 milliLiter(s) (75 mL/Hr) IV Continuous <Continuous>  dextrose 5% + lactated ringers. 1000 milliLiter(s) (70 mL/Hr) IV Continuous <Continuous>  pantoprazole  Injectable 40 milliGRAM(s) IV Push every 12 hours    MEDICATIONS  (PRN):      PHYSICAL EXAM:  GENERAL: thin/frail, calm, NAD  EYES: clear conjunctiva  ENMT: Moist mucous membranes  NECK: Supple, No JVD, Normal thyroid  CHEST/LUNG: Clear to auscultation bilaterally; No rales, rhonchi, wheezing, or rubs  HEART: S1, S2, Regular rate and rhythm  ABDOMEN: Soft, Nontender, Nondistended; Bowel sounds present  : +suprapubic cath with clear yellow urine  NEURO: awake, confused   EXTREMITIES: +right arm ecchymosis 2/2 IV infiltration from previous blood transfusion, no swelling. No LE edema, no calf tenderness  LYMPH: No lymphadenopathy noted  SKIN: No rashes or lesions    Consultant(s) Notes Reviewed:  [x ] YES  [ ] NO  Care Discussed with Consultants/Other Providers [ x] YES  [ ] NO    LABS:                        7.2    7.43  )-----------( 205      ( 08 Oct 2024 01:35 )             22.9     10-08    152[H]  |  121[H]  |  44[H]  ----------------------------<  123[H]  4.0   |  25  |  1.37[H]    Ca    8.5      08 Oct 2024 00:33  Phos  2.5     10-08  Mg     2.0     10-08    TPro  5.8[L]  /  Alb  2.5[L]  /  TBili  0.4  /  DBili  x   /  AST  11  /  ALT  11  /  AlkPhos  55  10-08    PT/INR - ( 08 Oct 2024 01:35 )   PT: 10.2 sec;   INR: 0.88 ratio         PTT - ( 08 Oct 2024 01:35 )  PTT:31.4 sec  CAPILLARY BLOOD GLUCOSE      POCT Blood Glucose.: 123 mg/dL (08 Oct 2024 05:54)  POCT Blood Glucose.: 134 mg/dL (08 Oct 2024 02:34)        Urinalysis Basic - ( 08 Oct 2024 00:33 )    Color: x / Appearance: x / SG: x / pH: x  Gluc: 123 mg/dL / Ketone: x  / Bili: x / Urobili: x   Blood: x / Protein: x / Nitrite: x   Leuk Esterase: x / RBC: x / WBC x   Sq Epi: x / Non Sq Epi: x / Bacteria: x        RADIOLOGY & ADDITIONAL TESTS:    Imaging Personally Reviewed:  [ ] YES  [ ] NO  
GI Progress Note    Patient is a 89y old  Male who presents with a chief complaint of acute anemia 2/2 possible Upper GI bleed (08 Oct 2024 12:38)    GI was consulted for hematemesis.    24-HOUR INTERVAL EVENTS: Patient seen and examined on unit. Patient resting in bed. agitated during exam. s/p EGD. Tolerating meals. No n/v/d as per covering RN. Patient in no signs of visible distress. No signs of active bleed    MEDICATIONS  (STANDING):  dextrose 5% + lactated ringers. 1000 milliLiter(s) (100 mL/Hr) IV Continuous <Continuous>  pantoprazole  Injectable 40 milliGRAM(s) IV Push every 12 hours  sucralfate 1 Gram(s) Oral every 6 hours    MEDICATIONS  (PRN):    __________________________________________________  REVIEW OF SYSTEMS:  A detailed set of ROS were asked and negative except those outlined in GI HPI above/below.   ________________________________________________  PHYSICAL EXAM    Vital Signs Last 24 Hrs  T(C): 36.3 (09 Oct 2024 07:35), Max: 36.6 (08 Oct 2024 23:29)  T(F): 97.3 (09 Oct 2024 07:35), Max: 97.8 (08 Oct 2024 23:29)  HR: 100 (09 Oct 2024 07:35) (75 - 100)  BP: 94/51 (09 Oct 2024 07:35) (88/58 - 113/71)  BP(mean): --  RR: 17 (09 Oct 2024 07:35) (16 - 20)  SpO2: 100% (09 Oct 2024 07:35) (94% - 100%)    Parameters below as of 09 Oct 2024 07:35  Patient On (Oxygen Delivery Method): room air        GEN: NAD  HEENT: EOMI, conjunctivae anicteric, neck supple, moist mucous membranes  PULM: LCTAB, no wheezing, rales, or rhonchi  CV: RRR, no m/r/g  GI: soft, NT, ND; +BS in all four quadrants, no ascites, no Brambila's sign  MSK: LIMA, no edema  NEURO: A&O x 3, no gross deficits  _________________________________________________  LABS:                        7.1    8.39  )-----------( 201      ( 09 Oct 2024 08:14 )             22.7     10-08    151[H]  |  120[H]  |  39[H]  ----------------------------<  117[H]  3.9   |  25  |  1.43[H]    Ca    8.5      08 Oct 2024 12:31  Phos  2.5     10-08  Mg     2.0     10-08    TPro  5.8[L]  /  Alb  2.5[L]  /  TBili  0.4  /  DBili  x   /  AST  11  /  ALT  11  /  AlkPhos  55  10-08    PT/INR - ( 08 Oct 2024 01:35 )   PT: 10.2 sec;   INR: 0.88 ratio         PTT - ( 08 Oct 2024 01:35 )  PTT:31.4 sec  Urinalysis Basic - ( 08 Oct 2024 12:31 )    Color: x / Appearance: x / SG: x / pH: x  Gluc: 117 mg/dL / Ketone: x  / Bili: x / Urobili: x   Blood: x / Protein: x / Nitrite: x   Leuk Esterase: x / RBC: x / WBC x   Sq Epi: x / Non Sq Epi: x / Bacteria: x      CAPILLARY BLOOD GLUCOSE      POCT Blood Glucose.: 181 mg/dL (08 Oct 2024 17:36)  POCT Blood Glucose.: 131 mg/dL (08 Oct 2024 14:33)        RADIOLOGY & ADDITIONAL TESTS:        < from: EGD (10.08.24 @ 04:30) >  Findings:        Esophagus Normal esophagus.        Stomach Mucosa Few scattered linear erosions in the antrum and body.        Duodenum Excavated lesions One 5 mm ulcer was found in the duodenal bulb with a    visible vessel. For hemostasis, one 11 mm/a over-the-scope clip was placed over    the vessel. The clip was in good position. There was no bleeding at conclusion.    Normal second portion.        Impressions:        Duodenal bulb ulcer with visible vessel. Hemostasis with over-the-scope clip.          This note and its recommendations herein are preliminary until such time as cosigned by an attending.
Patient is a 89y old  Male who presents with a chief complaint of GIB (09 Oct 2024 12:01)    OVERNIGHT EVENTS: no acute changes.     Pt is awake but confused, tolerating clear liquids and diet was advanced to full liquids, remains in bed due to weakness.     REVIEW OF SYSTEMS:  unable to assess due to mental status    T(C): 36.4 (10-09-24 @ 11:14), Max: 36.6 (10-08-24 @ 23:29)  HR: 74 (10-09-24 @ 11:14) (74 - 100)  BP: 85/48 (10-09-24 @ 11:14) (85/48 - 107/59)  RR: 18 (10-09-24 @ 11:14) (16 - 20)  SpO2: 100% (10-09-24 @ 11:14) (94% - 100%)  Wt(kg): --Vital Signs Last 24 Hrs  T(C): 36.4 (09 Oct 2024 11:14), Max: 36.6 (08 Oct 2024 23:29)  T(F): 97.5 (09 Oct 2024 11:14), Max: 97.8 (08 Oct 2024 23:29)  HR: 74 (09 Oct 2024 11:14) (74 - 100)  BP: 85/48 (09 Oct 2024 11:14) (85/48 - 107/59)  BP(mean): --  RR: 18 (09 Oct 2024 11:14) (16 - 20)  SpO2: 100% (09 Oct 2024 11:14) (94% - 100%)    Parameters below as of 09 Oct 2024 11:14  Patient On (Oxygen Delivery Method): room air        MEDICATIONS  (STANDING):  dextrose 5% + lactated ringers. 1000 milliLiter(s) (100 mL/Hr) IV Continuous <Continuous>  dextrose 5% + lactated ringers. 1000 milliLiter(s) (100 mL/Hr) IV Continuous <Continuous>  pantoprazole  Injectable 40 milliGRAM(s) IV Push every 12 hours  sucralfate 1 Gram(s) Oral every 6 hours    MEDICATIONS  (PRN):      PHYSICAL EXAM:  GENERAL: thin/frail, calm, NAD  EYES: clear conjunctiva  ENMT: Moist mucous membranes  NECK: Supple, No JVD, Normal thyroid  CHEST/LUNG: Clear to auscultation bilaterally; No rales, rhonchi, wheezing, or rubs  HEART: S1, S2, Regular rate and rhythm  ABDOMEN: Soft, Nontender, Nondistended; Bowel sounds present  : +suprapubic cath with clear yellow urine  NEURO: awake, confused   EXTREMITIES: +right arm ecchymosis 2/2 IV infiltration from previous blood transfusion, no swelling. No LE edema, no calf tenderness  LYMPH: No lymphadenopathy noted  SKIN: No rashes or lesions    Consultant(s) Notes Reviewed:  [x ] YES  [ ] NO  Care Discussed with Consultants/Other Providers [ x] YES  [ ] NO    LABS:                        7.1    8.39  )-----------( 201      ( 09 Oct 2024 08:14 )             22.7     10-09    148[H]  |  117[H]  |  27[H]  ----------------------------<  155[H]  3.6   |  26  |  1.22    Ca    8.5      09 Oct 2024 08:14  Phos  2.2     10-09  Mg     1.8     10-09    TPro  5.8[L]  /  Alb  2.5[L]  /  TBili  0.4  /  DBili  x   /  AST  11  /  ALT  11  /  AlkPhos  55  10-08    PT/INR - ( 08 Oct 2024 01:35 )   PT: 10.2 sec;   INR: 0.88 ratio         PTT - ( 08 Oct 2024 01:35 )  PTT:31.4 sec  CAPILLARY BLOOD GLUCOSE      POCT Blood Glucose.: 181 mg/dL (08 Oct 2024 17:36)  POCT Blood Glucose.: 131 mg/dL (08 Oct 2024 14:33)        Urinalysis Basic - ( 09 Oct 2024 08:14 )    Color: x / Appearance: x / SG: x / pH: x  Gluc: 155 mg/dL / Ketone: x  / Bili: x / Urobili: x   Blood: x / Protein: x / Nitrite: x   Leuk Esterase: x / RBC: x / WBC x   Sq Epi: x / Non Sq Epi: x / Bacteria: x        RADIOLOGY & ADDITIONAL TESTS:  < from: EGD (10.08.24 @ 04:30) >  indings:        Esophagus Normal esophagus.        Stomach Mucosa Few scattered linear erosions in the antrum and body.        Duodenum Excavated lesions One 5 mm ulcer was found in the duodenal bulb with a    visible vessel. For hemostasis, one 11 mm/a over-the-scope clip was placed over    the vessel. The clip was in good position. There was no bleeding at conclusion.    Normal second portion.      < end of copied text >    Imaging Personally Reviewed:  [ ] YES  [ ] NO  
[   ] ICU                                          [   ] CCU                                      [ X  ] Medical Floor    Patient is a 89 year old male with GI bleeding. GI consulted to evaluate.       89 year old male from home, wheelchair bound, with past medical history significant for Dementia, GERD, DVT, remote GIB, TBI, obstructive uropathy s/p suprapubic catheter, pshx benign thyroid mass resection, presented to the [facility] with coffee ground emesis. Patient now transferred to [facility] for further care. Patient AAOx1 at baseline, Kuwaiti speaking and can not provide history. Information obtained from the patient's son, who stated that patient had a remote hx of DVT, concern for PE in the past however never on AC for it, reported episode of coffee ground emesis, melena for which he visited [facility], one unit pRBC was provided however extravasated and patient's Hgb on 10/6 was 8, INR WNL, BUN 86. SCr 1.5. No abdominal pain, nausea, vomiting, hematochezia, melena, fever, chills, chest pain, SOB, cough, hemoptysis, epistaxis, hematuria or diarrhea reported.    Patient appears comfortable today. No new complaints reported, No abdominal pain, N/V, hematemesis, hematochezia, melena, fever, chills, chest pain, SOB, cough or diarrhea reported.         PAIN MANAGEMENT:  Pain Scale:                0 /10  Pain Location:           PAST MEDICAL HISTORY    Dementia    GERD    GI bleeding    DVT    Obstructive uropathy        PAST SURGICAL HISTORY    Suprapubic catheter placement    Thyroid mass resection         Allergies    No Known Allergies    Intolerances  None          SOCIAL HISTORY  Advanced Directives:       [ X ] Full Code       [  ] DNR  Marital Status:         [  ] M      [ X ] S      [  ] D       [  ] W  Children:       [ X ] Yes      [  ] No  Occupation:        [  ] Employed       [X  ] Unemployed       [  ] Retired  Diet:       [ X ] Regular       [  ] PEG feeding          [  ] NG tube feeding  Drug Use:           [ X ] No               [  ] Yes  Alcohol:           [ X ] No             [  ] Yes (socially)         [  ] Yes (chronic)  Tobacco:           [  ] Yes           [ X ] No      FAMILY HISTORY  [ X ] Heart Disease            [ X ] Diabetes             [ X ] HTN             [  ] Colon Cancer             [  ] Stomach Cancer              [  ] Pancreatic Cancer        VITALS   Vital Signs Last 24 Hrs  T(C): 36.6 (10-10-24 @ 07:15), Max: 36.7 (10-09-24 @ 18:25)  T(F): 97.9 (10-10-24 @ 07:15), Max: 98.1 (10-09-24 @ 18:25)  HR: 67 (10-10-24 @ 07:15) (62 - 105)  BP: 110/67 (10-10-24 @ 07:15) (85/48 - 110/67)   RR: 19 (10-10-24 @ 07:15) (18 - 19)  SpO2: 99% (10-10-24 @ 07:15) (97% - 100%)      MEDICATIONS  (STANDING):  dextrose 5% + lactated ringers. 1000 milliLiter(s) (100 mL/Hr) IV Continuous <Continuous>  pantoprazole  Injectable 40 milliGRAM(s) IV Push every 12 hours  sucralfate 1 Gram(s) Oral every 6 hours    MEDICATIONS  (PRN):                            8.1    8.52  )-----------( 189      ( 10 Oct 2024 08:10 )             25.1       10-10    143  |  114[H]  |  26[H]  ----------------------------<  139[H]  3.7   |  24  |  1.44[H]    Ca    8.0[L]      10 Oct 2024 08:10  Phos  2.2     10-09  Mg     1.8     10-09

## 2024-10-10 NOTE — DIETITIAN INITIAL EVALUATION ADULT - PERTINENT MEDS FT
For Dr. Chris Lofton nurse line, call  [phone] with any questions or concerns Monday through Friday 8:00 to 4:30.   After hours or weekends for emergent needs [phone]
MEDICATIONS  (STANDING):  dextrose 5% + lactated ringers. 1000 milliLiter(s) (100 mL/Hr) IV Continuous <Continuous>  pantoprazole  Injectable 40 milliGRAM(s) IV Push every 12 hours  sucralfate 1 Gram(s) Oral every 6 hours    MEDICATIONS  (PRN):

## 2024-10-10 NOTE — DIETITIAN INITIAL EVALUATION ADULT - ORAL INTAKE PTA/DIET HISTORY
Pt is from LTC, alert however disorientated with some confusion. H/o dementia, GERD, remote GIB, TBI, benign thyroid mass resection; admitted for gastrointestinal bleeding; s/p EGD. Pt reports good appetite PTA and in-house consuming % of meals. Nursing staff reports that pt is with good PO intake of meals and oral supplement order. Pt was on full liquid diet at the time of visit, however currently upgraded to PO diet. No chewing/ swallowing issues reported. Denies any GI distress. Food prefs obtained and communicated to kitchen. NKFA.  lbs as per pt (unable to recall time frame); reports height of 68 inches. NFPE completed with results below.

## 2024-10-10 NOTE — PROGRESS NOTE ADULT - ASSESSMENT
1. Coffee ground emesis stopped  2. Sever anemia  3. S/p EGD  4. Duodenal ulcer  5. No evidence of acute GI bleeding at present time    Suggestions:    1. Monitor H/H  2. Transfuse PRBC as needed  3. Continue protonix  4. Clear liquid diet  5. Avoid NSAID  6. Continue Carafate  7. Check stool for H. Pylori Ag  8. DVT prophylaxis
1. Coffee ground emesis stopped  2. Sever anemia (H/H stable)  3. S/p EGD  4. Duodenal ulcer  5. No evidence of acute GI bleeding at present time    Suggestions:    1. Monitor H/H  2. Transfuse PRBC as needed  3. Continue Protonix  4. Diet as tolerated  5. Avoid NSAID  6. Continue Carafate  7. Check stool for H. Pylori Ag  8. DVT prophylaxis
89 yrs old M, from home, wheelchair bound, pmhx of dementia, GERD, remote GIB, TBI, obstructive uropathy s/p suprapubic catheter, pshx benign thyroid mass resection, transferred to [facility] for further management of upper GI bleed. GI consulted for hematemesis
89 yrs old M, from home, Gibraltarian speaking, wheelchair bound, pmhx of dementia, GERD, remote GIB, TBI, obstructive uropathy s/p suprapubic catheter, pshx benign thyroid mass resection, transferred from [facility] to [facility] for further management of upper GI bleed. Pt is admitted for GIB requiring endoscopy. Pt is now s/p 3 units PRBC total so far.  GI consulted, awaiting Endoscopy. 
89 yrs old M, from LTC facility, Ugandan speaking, wheelchair bound, pmhx of dementia, GERD, remote GIB, TBI, obstructive uropathy s/p suprapubic catheter, pshx benign thyroid mass resection, transferred from [facility] to [facility] for further management of upper GI bleed. Pt is admitted for GIB requiring endoscopy. Pt is now s/p 3 units PRBC total so far. GI following, s/p endoscopy on 10/9, found to have bleeding duodenal ulcer s/p clipping. Repeat hgb today now 7.1 with hypotension. Will plan to transfuse additional 1 unit PRBC today.

## 2024-10-10 NOTE — DISCHARGE NOTE NURSING/CASE MANAGEMENT/SOCIAL WORK - PATIENT PORTAL LINK FT
You can access the FollowMyHealth Patient Portal offered by [facility] by registering at the following website: [email or web address]. By joining [facility]’s FollowMyHealth portal, you will also be able to view your health information using other applications (apps) compatible with our system.

## 2024-10-10 NOTE — DIETITIAN NUTRITION RISK NOTIFICATION - ADDITIONAL COMMENTS/DIETITIAN RECOMMENDATIONS
Recommend to change oral supplement to Ensure Plus HP BID (350 kcal, 20 g pro each) as medically feasible; honor food preferences.

## 2024-10-10 NOTE — DIETITIAN INITIAL EVALUATION ADULT - NS FNS DIET ORDER
Diet, Minced and Moist:   Kosher  Supplement Feeding Modality:  Oral  Ensure Enlive Cans or Servings Per Day:  1       Frequency:  Three Times a day (10-10-24 @ 09:37)

## 2024-10-10 NOTE — DIETITIAN INITIAL EVALUATION ADULT - ADD RECOMMEND
Moderate malnutrition; Recommend to change oral supplement to Ensure Plus HP BID (350 kcal, 20 g pro each) as medically feasible; honor food preferences.

## 2024-10-10 NOTE — DIETITIAN NUTRITION RISK NOTIFICATION - TREATMENT: THE FOLLOWING DIET HAS BEEN RECOMMENDED
Diet, Minced and Moist:   Kosher  Supplement Feeding Modality:  Oral  Ensure Enlive Cans or Servings Per Day:  1       Frequency:  Three Times a day (10-10-24 @ 09:37) [Active]

## 2024-10-10 NOTE — PROGRESS NOTE ADULT - NEUROLOGICAL
sensation intact/responds to pain/no spontaneous movement
sensation intact/responds to pain/no spontaneous movement

## 2024-10-10 NOTE — DIETITIAN INITIAL EVALUATION ADULT - DIET TYPE
Recommend to change oral supplement to Ensure Plus HP BID (350 kcal, 20 g pro each) as medically feasible./supplement (specify)

## 2024-10-10 NOTE — PROGRESS NOTE ADULT - RESPIRATORY
clear to auscultation bilaterally/no wheezes/no rales/no rhonchi/no respiratory distress/airway patent/breath sounds equal/good air movement
clear to auscultation bilaterally/no wheezes/no rales/no rhonchi/no respiratory distress/airway patent/breath sounds equal/good air movement

## 2024-10-10 NOTE — DIETITIAN INITIAL EVALUATION ADULT - PERTINENT LABORATORY DATA
10-10    143  |  114[H]  |  26[H]  ----------------------------<  139[H]  3.7   |  24  |  1.44[H]    Ca    8.0[L]      10 Oct 2024 08:10  Phos  2.2     10-09  Mg     1.8     10-09

## 2024-11-20 NOTE — PROGRESS NOTE ADULT - PROBLEM/PLAN-6
Expected Date Of Service: 11/20/2024 Billing Type: Third-Party Bill Bill For Surgical Tray: no Performing Laboratory: -11 DISPLAY PLAN FREE TEXT

## 2024-11-26 PROCEDURE — 84484 ASSAY OF TROPONIN QUANT: CPT

## 2024-11-26 PROCEDURE — 86850 RBC ANTIBODY SCREEN: CPT

## 2024-11-26 PROCEDURE — 36415 COLL VENOUS BLD VENIPUNCTURE: CPT

## 2024-11-26 PROCEDURE — 85730 THROMBOPLASTIN TIME PARTIAL: CPT

## 2024-11-26 PROCEDURE — P9040: CPT

## 2024-11-26 PROCEDURE — 84100 ASSAY OF PHOSPHORUS: CPT

## 2024-11-26 PROCEDURE — 36430 TRANSFUSION BLD/BLD COMPNT: CPT

## 2024-11-26 PROCEDURE — 82962 GLUCOSE BLOOD TEST: CPT

## 2024-11-26 PROCEDURE — 83935 ASSAY OF URINE OSMOLALITY: CPT

## 2024-11-26 PROCEDURE — 86900 BLOOD TYPING SEROLOGIC ABO: CPT

## 2024-11-26 PROCEDURE — 85045 AUTOMATED RETICULOCYTE COUNT: CPT

## 2024-11-26 PROCEDURE — 85027 COMPLETE CBC AUTOMATED: CPT

## 2024-11-26 PROCEDURE — 84300 ASSAY OF URINE SODIUM: CPT

## 2024-11-26 PROCEDURE — 80053 COMPREHEN METABOLIC PANEL: CPT

## 2024-11-26 PROCEDURE — 80048 BASIC METABOLIC PNL TOTAL CA: CPT

## 2024-11-26 PROCEDURE — C1889: CPT

## 2024-11-26 PROCEDURE — 83880 ASSAY OF NATRIURETIC PEPTIDE: CPT

## 2024-11-26 PROCEDURE — 86901 BLOOD TYPING SEROLOGIC RH(D): CPT

## 2024-11-26 PROCEDURE — 83735 ASSAY OF MAGNESIUM: CPT

## 2024-11-26 PROCEDURE — 85025 COMPLETE CBC W/AUTO DIFF WBC: CPT

## 2024-11-26 PROCEDURE — 85610 PROTHROMBIN TIME: CPT

## 2024-11-26 PROCEDURE — 86923 COMPATIBILITY TEST ELECTRIC: CPT

## 2024-12-10 ENCOUNTER — APPOINTMENT (OUTPATIENT)
Dept: GASTROENTEROLOGY | Facility: CLINIC | Age: 88
End: 2024-12-10
